# Patient Record
Sex: MALE | Race: WHITE | NOT HISPANIC OR LATINO | URBAN - METROPOLITAN AREA
[De-identification: names, ages, dates, MRNs, and addresses within clinical notes are randomized per-mention and may not be internally consistent; named-entity substitution may affect disease eponyms.]

---

## 2017-05-11 ENCOUNTER — IMPORTED ENCOUNTER (OUTPATIENT)
Dept: URBAN - METROPOLITAN AREA CLINIC 59 | Facility: CLINIC | Age: 52
End: 2017-05-11

## 2017-05-11 PROBLEM — H35.351 CYSTOID MACULAR DEGENERATION, RIGHT EYE: Noted: 2017-05-11

## 2017-05-11 PROBLEM — E11.9 TYPE II DM W/O COMPLICATIONS: Noted: 2017-05-11

## 2017-05-11 PROBLEM — H20.023 RECURRENT ACUTE IRIDOCYCLITIS, BILATERAL: Noted: 2017-05-11

## 2017-05-11 PROCEDURE — 99214 OFFICE O/P EST MOD 30 MIN: CPT

## 2018-05-31 ENCOUNTER — IMPORTED ENCOUNTER (OUTPATIENT)
Dept: URBAN - METROPOLITAN AREA CLINIC 59 | Facility: CLINIC | Age: 53
End: 2018-05-31

## 2018-05-31 PROBLEM — H35.351 CYSTOID MACULAR DEGENERATION, RIGHT EYE: Noted: 2018-05-31

## 2018-05-31 PROBLEM — E11.9 TYPE II DM W/O COMPLICATIONS: Noted: 2018-05-31

## 2018-05-31 PROBLEM — H20.021 RECURRENT ACUTE IRIDOCYCLITIS, RIGHT EYE: Noted: 2018-05-31

## 2018-05-31 PROCEDURE — 99214 OFFICE O/P EST MOD 30 MIN: CPT

## 2018-06-11 ENCOUNTER — IMPORTED ENCOUNTER (OUTPATIENT)
Dept: URBAN - METROPOLITAN AREA CLINIC 59 | Facility: CLINIC | Age: 53
End: 2018-06-11

## 2018-06-11 PROBLEM — E11.9 TYPE II DM W/O COMPLICATIONS: Noted: 2018-06-11

## 2018-06-11 PROBLEM — H35.351 CYSTOID MACULAR DEGENERATION, RIGHT EYE: Noted: 2018-06-11

## 2018-06-11 PROBLEM — H20.021 RECURRENT ACUTE IRIDOCYCLITIS, RIGHT EYE: Noted: 2018-06-11

## 2018-06-11 PROBLEM — H04.123 TEAR FILM INSUFFICIENCY OF BILATERAL LACRIMAL GLANDS: Noted: 2018-06-11

## 2018-06-11 PROCEDURE — 92015 DETERMINE REFRACTIVE STATE: CPT

## 2018-06-11 PROCEDURE — 99213 OFFICE O/P EST LOW 20 MIN: CPT

## 2019-09-19 ENCOUNTER — IMPORTED ENCOUNTER (OUTPATIENT)
Dept: URBAN - METROPOLITAN AREA CLINIC 59 | Facility: CLINIC | Age: 54
End: 2019-09-19

## 2019-09-19 PROBLEM — H35.351 CYSTOID MACULAR DEGENERATION, RIGHT EYE: Noted: 2019-09-19

## 2019-09-19 PROBLEM — E11.9 TYPE II DM W/O COMPLICATIONS: Noted: 2019-09-19

## 2019-09-19 PROBLEM — H20.021 RECURRENT ACUTE IRIDOCYCLITIS, RIGHT EYE: Noted: 2019-09-19

## 2019-09-19 PROBLEM — H04.123 TEAR FILM INSUFFICIENCY OF BILATERAL LACRIMAL GLANDS: Noted: 2019-09-19

## 2019-09-19 PROCEDURE — 99214 OFFICE O/P EST MOD 30 MIN: CPT

## 2019-09-19 PROCEDURE — 92015 DETERMINE REFRACTIVE STATE: CPT

## 2020-12-03 ENCOUNTER — IMPORTED ENCOUNTER (OUTPATIENT)
Dept: URBAN - METROPOLITAN AREA CLINIC 59 | Facility: CLINIC | Age: 55
End: 2020-12-03

## 2020-12-03 PROBLEM — H20.021 RECURRENT ACUTE IRIDOCYCLITIS, RIGHT EYE: Noted: 2020-12-03

## 2020-12-03 PROBLEM — E11.9 TYPE II DM W/O COMPLICATIONS: Noted: 2020-12-03

## 2020-12-03 PROBLEM — H04.123 TEAR FILM INSUFFICIENCY OF BILATERAL LACRIMAL GLANDS: Noted: 2020-12-03

## 2020-12-03 PROBLEM — H35.351 CYSTOID MACULAR DEGENERATION, RIGHT EYE: Noted: 2020-12-03

## 2020-12-03 PROCEDURE — 99214 OFFICE O/P EST MOD 30 MIN: CPT

## 2021-05-21 ENCOUNTER — APPOINTMENT (RX ONLY)
Dept: URBAN - METROPOLITAN AREA CLINIC 348 | Facility: CLINIC | Age: 56
Setting detail: DERMATOLOGY
End: 2021-05-21

## 2021-05-21 DIAGNOSIS — L82.0 INFLAMED SEBORRHEIC KERATOSIS: ICD-10-CM

## 2021-05-21 PROBLEM — E13.9 OTHER SPECIFIED DIABETES MELLITUS WITHOUT COMPLICATIONS: Status: ACTIVE | Noted: 2021-05-21

## 2021-05-21 PROCEDURE — 17110 DESTRUCTION B9 LES UP TO 14: CPT

## 2021-05-21 PROCEDURE — ? COUNSELING

## 2021-05-21 PROCEDURE — ? LIQUID NITROGEN

## 2021-05-21 ASSESSMENT — LOCATION DETAILED DESCRIPTION DERM: LOCATION DETAILED: RIGHT MID TEMPLE

## 2021-05-21 ASSESSMENT — LOCATION SIMPLE DESCRIPTION DERM: LOCATION SIMPLE: RIGHT TEMPLE

## 2021-05-21 ASSESSMENT — LOCATION ZONE DERM: LOCATION ZONE: FACE

## 2021-05-21 NOTE — PROCEDURE: LIQUID NITROGEN
Medical Necessity Information: It is in your best interest to select a reason for this procedure from the list below. All of these items fulfill various CMS LCD requirements except the new and changing color options.
Render Note In Bullet Format When Appropriate: No
Medical Necessity Clause: This procedure was medically necessary because the lesions that were treated were:
Consent: The patient's consent was obtained including but not limited to risks of crusting, scabbing, blistering, scarring, darker or lighter pigmentary change, recurrence, incomplete removal and infection.
Detail Level: Detailed
Post-Care Instructions: I reviewed with the patient in detail post-care instructions. Patient is to wear sunprotection, and avoid picking at any of the treated lesions. Pt may apply Vaseline to crusted or scabbing areas.
Number Of Freeze-Thaw Cycles: 1 freeze-thaw cycle

## 2022-08-11 ENCOUNTER — IMPORTED ENCOUNTER (OUTPATIENT)
Dept: URBAN - METROPOLITAN AREA CLINIC 59 | Facility: CLINIC | Age: 57
End: 2022-08-11

## 2022-08-11 PROBLEM — H35.351 CYSTOID MACULAR DEGENERATION, RIGHT EYE: Noted: 2022-08-11

## 2022-08-11 PROBLEM — H04.123 TEAR FILM INSUFFICIENCY OF BILATERAL LACRIMAL GLANDS: Noted: 2022-08-11

## 2022-08-11 PROBLEM — H20.021 RECURRENT IRITIS: Noted: 2022-08-11

## 2022-08-11 PROBLEM — H20.021 RECURRENT ACUTE IRIDOCYCLITIS, RIGHT EYE: Noted: 2022-08-11

## 2022-08-11 PROBLEM — H04.123 DES: Noted: 2022-08-11

## 2022-08-11 PROBLEM — Z79.4 TYPE II DM W/O COMPLICATIONS: Noted: 2022-08-11

## 2022-08-11 PROBLEM — E11.9 TYPE II DM W/O COMPLICATIONS: Noted: 2022-08-11

## 2022-08-11 PROCEDURE — 92014 COMPRE OPH EXAM EST PT 1/>: CPT

## 2022-08-11 PROCEDURE — 92015 DETERMINE REFRACTIVE STATE: CPT

## 2023-10-15 ASSESSMENT — VISUAL ACUITY
OS_CC: 20/20
OD_CC: 20/20-1
OD_CC: 20/20-1
OS_CC: 20/20
OS_CC: J5
OD_CC: 20/20
OD_CC: 20/20
OS_CC: 20/20
OD_CC: 20/20
OS_CC: 20/20
OS_CC: 20/20-1
OS_CC: 20/20
OD_CC: 20/20
OD_CC: J5

## 2023-10-15 ASSESSMENT — TONOMETRY
OS_IOP_MMHG: 13
OS_IOP_MMHG: 12
OD_IOP_MMHG: 10
OD_IOP_MMHG: 12
OS_IOP_MMHG: 12
OS_IOP_MMHG: 14
OS_IOP_MMHG: 14
OD_IOP_MMHG: 14
OD_IOP_MMHG: 15
OD_IOP_MMHG: 13

## 2023-10-15 ASSESSMENT — PACHYMETRY
OS_CT_UM: C:  FINAL: 0.000; P:  FINAL: 0.000
OD_CT_UM: C:  FINAL: 0.000; P:  FINAL: 0.000

## 2024-02-12 ENCOUNTER — ESTABLISHED COMPREHENSIVE EXAM (OUTPATIENT)
Dept: URBAN - METROPOLITAN AREA CLINIC 59 | Facility: CLINIC | Age: 59
End: 2024-02-12

## 2024-02-12 DIAGNOSIS — H04.123: ICD-10-CM

## 2024-02-12 DIAGNOSIS — H20.021: ICD-10-CM

## 2024-02-12 DIAGNOSIS — Z79.4: ICD-10-CM

## 2024-02-12 DIAGNOSIS — H43.393: ICD-10-CM

## 2024-02-12 DIAGNOSIS — E11.9: ICD-10-CM

## 2024-02-12 PROCEDURE — 92014 COMPRE OPH EXAM EST PT 1/>: CPT

## 2024-02-12 ASSESSMENT — TONOMETRY
OS_IOP_MMHG: 11
OD_IOP_MMHG: 10

## 2024-02-12 ASSESSMENT — VISUAL ACUITY
OS_CC: 20/20
OD_CC: 20/20